# Patient Record
Sex: MALE | Race: WHITE | Employment: UNEMPLOYED | URBAN - METROPOLITAN AREA
[De-identification: names, ages, dates, MRNs, and addresses within clinical notes are randomized per-mention and may not be internally consistent; named-entity substitution may affect disease eponyms.]

---

## 2017-05-11 ENCOUNTER — OFFICE VISIT (OUTPATIENT)
Dept: FAMILY MEDICINE CLINIC | Age: 32
End: 2017-05-11

## 2017-05-11 VITALS
SYSTOLIC BLOOD PRESSURE: 135 MMHG | WEIGHT: 245 LBS | HEART RATE: 81 BPM | OXYGEN SATURATION: 96 % | TEMPERATURE: 97.8 F | RESPIRATION RATE: 18 BRPM | HEIGHT: 68 IN | BODY MASS INDEX: 37.13 KG/M2 | DIASTOLIC BLOOD PRESSURE: 77 MMHG

## 2017-05-11 DIAGNOSIS — Z00.00 PHYSICAL EXAM, ANNUAL: Primary | ICD-10-CM

## 2017-05-11 DIAGNOSIS — Z23 ENCOUNTER FOR IMMUNIZATION: ICD-10-CM

## 2017-05-11 DIAGNOSIS — E66.9 OBESITY (BMI 35.0-39.9 WITHOUT COMORBIDITY): ICD-10-CM

## 2017-05-11 PROBLEM — K21.9 GASTROESOPHAGEAL REFLUX DISEASE WITHOUT ESOPHAGITIS: Status: ACTIVE | Noted: 2017-05-11

## 2017-05-11 RX ORDER — FAMOTIDINE 20 MG/1
20 TABLET, FILM COATED ORAL 2 TIMES DAILY
COMMUNITY

## 2017-05-11 NOTE — MR AVS SNAPSHOT
Visit Information Date & Time Provider Department Dept. Phone Encounter #  
 5/11/2017 11:00 AM Reji Medellin, [address] [place] [address] Road [phone] [id number] Upcoming Health Maintenance Date Due DTaP/Tdap/Td series (1 - Tdap) 5/18/2008 INFLUENZA AGE 9 TO ADULT 8/1/2017 Allergies as of 5/11/2017  Review Complete On: 5/11/2017 By: Reji Medellin NP No Known Allergies Current Immunizations  Never Reviewed No immunizations on file. Not reviewed this visit You Were Diagnosed With   
  
 Codes Comments Physical exam, annual    -  Primary ICD-10-CM: Z00.00 ICD-9-CM: V70.0 Obesity (BMI 35.0-39.9 without comorbidity) ([facility]     ICD-10-CM: P61.0 ICD-9-CM: 278.00 Vitals BP Pulse Temp Resp Height(growth percentile) Weight(growth percentile) 135/77 (BP 1 Location: Left arm, BP Patient Position: Sitting) 81 97.8 °F (36.6 °C) (Oral) 18 5' 8\" (1.727 m) 245 lb (111.1 kg) SpO2 BMI Smoking Status 96% 37.25 kg/m2 Never Smoker Vitals History BMI and BSA Data Body Mass Index Body Surface Area  
 37.25 kg/m 2 2.31 m 2 Preferred Pharmacy [facility] Phone [id number] [phone] Your Updated Medication List  
  
   
This list is accurate as of: 5/11/17 12:19 PM.  Always use your most recent med list.  
  
  
  
  
 famotidine 20 mg tablet Commonly known as:  PEPCID Take 20 mg by mouth two (2) times a day. We Performed the Following AMB POC URINALYSIS DIP STICK AUTO W/ MICRO [69180 CPT(R)] CBC W/O DIFF [18987 CPT(R)] HEMOGLOBIN A1C WITH EAG [08067 CPT(R)] LIPID PANEL [92653 CPT(R)] METABOLIC PANEL, COMPREHENSIVE [68848 CPT(R)] [place] COLLECTION VENOUS BLOOD,VENIPUNCTURE [id number] CPT(R)] [place] HANDLG&/OR CONVEY OF SPEC FOR TR OFFICE TO LAB [03078 CPT(R)] URINALYSIS W/ RFLX MICROSCOPIC [15569 [facility](R)] Introducing [facility]! Juan José Murphy introduces [employer] patient portal. Now you can access parts of your medical record, email your doctor's office, and request medication refills online. 1. In your internet browser, go to [email or web address] 2. Click on the First Time User? Click Here link in the Sign In box. You will see the New Member Sign Up page. 3. Enter your [employer] Access Code exactly as it appears below. You will not need to use this code after youve completed the sign-up process. If you do not sign up before the expiration date, you must request a new code. · [employer] Access Code: [email or web address] Expires: 8/9/2017 11:42 AM 
 
4. Enter the last four digits of your Social Security Number (xxxx) and Date of Birth (mm/dd/yyyy) as indicated and click Submit. You will be taken to the next sign-up page. 5. Create a SquareHub ID. This will be your [employer]Hub login ID and cannot be changed, so think of one that is secure and easy to remember. 6. Create a [employer]Hub password. You can change your password at any time. 7. Enter your Password Reset Question and Answer. This can be used at a later time if you forget your password. 8. Enter your e-mail address. You will receive e-mail notification when new information is available in [address] 9. Click Sign Up. You can now view and download portions of your medical record. 10. Click the Download Summary menu link to download a portable copy of your medical information. If you have questions, please visit the Frequently Asked Questions section of the [employer] website. Remember, [employer] is NOT to be used for urgent needs. For medical emergencies, dial 911. Now available from your iPhone and Android! Please provide this summary of care documentation to your next provider. Your primary care clinician is listed as NONE. If you have any questions after today's visit, please call [phone].

## 2017-05-11 NOTE — PROGRESS NOTES
1. Have you been to the ER, urgent care clinic since your last visit? Hospitalized since your last visit? No    2. Have you seen or consulted any other health care providers outside of the [address] [place] [address] since your last visit? Include any pap smears or colon screening.  No     Chief Complaint   Patient presents with    New Patient     establish care and PCP    Complete Physical     patient is here for annual exam       Learning Assessment 5/11/2017   PRIMARY LEARNER Patient   PRIMARY LANGUAGE ENGLISH   LEARNER PREFERENCE PRIMARY LISTENING   ANSWERED BY eric paul   RELATIONSHIP SELF

## 2017-05-11 NOTE — PROGRESS NOTES
HISTORY OF PRESENT ILLNESS  Kandace Qiu is a 36 y.o. male. HPI  New patient to establish care. Admits to not consistently following a healthy diet and does not exercise on a regular basis. History of GERD. Takes OTC pepcid daily. Past Medical History:   Diagnosis Date    Acid reflux     GERD (gastroesophageal reflux disease)     Pancreatitis 2015    secondary to gall stones     Patient Active Problem List   Diagnosis Code    Obesity (BMI 35.0-39.9 without comorbidity) ([facility]) E66.9    Gastroesophageal reflux disease without esophagitis K21.9     Current Outpatient Prescriptions   Medication Sig    famotidine (PEPCID) 20 mg tablet Take 20 mg by mouth two (2) times a day. No current facility-administered medications for this visit. Social History   Substance Use Topics    Smoking status: Never Smoker    Smokeless tobacco: Never Used    Alcohol use Yes      Comment: social     Visit Vitals    /77 (BP 1 Location: Left arm, BP Patient Position: Sitting)    Pulse 81    Temp 97.8 °F (36.6 °C) (Oral)    Resp 18    Ht 5' 8\" (1.727 m)    Wt 245 lb (111.1 kg)    SpO2 96%    BMI 37.25 kg/m2     Review of Systems   Constitutional: Negative. Respiratory: Negative for cough and shortness of breath. Cardiovascular: Negative for chest pain, palpitations and leg swelling. Gastrointestinal: Positive for heartburn. Negative for abdominal pain, blood in stool, constipation, diarrhea, nausea and vomiting. Neurological: Negative for headaches. All other systems reviewed and are negative. Physical Exam   Constitutional: He is oriented to person, place, and time. No distress. Obese. HENT:   Right Ear: Tympanic membrane and ear canal normal.   Left Ear: Tympanic membrane and ear canal normal.   Mouth/Throat: Oropharynx is clear and moist.   Eyes: Conjunctivae and EOM are normal. Pupils are equal, round, and reactive to light. Neck: Neck supple. No thyromegaly present. Cardiovascular: Normal rate, regular rhythm and normal heart sounds. Pulmonary/Chest: Effort normal and breath sounds normal.   Abdominal: Soft. Bowel sounds are normal. He exhibits no distension. There is no tenderness. There is no rebound and no guarding. Hernia confirmed negative in the right inguinal area and confirmed negative in the left inguinal area. Genitourinary: Testes normal and penis normal. Right testis shows no mass. Left testis shows no mass. Musculoskeletal: Normal range of motion. He exhibits no edema. Lymphadenopathy:     He has no cervical adenopathy. Neurological: He is alert and oriented to person, place, and time. He has normal reflexes. No cranial nerve deficit. Coordination normal.   Skin: Skin is warm and dry. Scattered nevi on back. Psychiatric: He has a normal mood and affect. His behavior is normal. Thought content normal.       ASSESSMENT and PLAN  Marcelino Holguin was seen today for new patient and complete physical.    Diagnoses and all orders for this visit:    Physical exam, annual  -     AMB POC URINALYSIS DIP STICK AUTO W/ MICRO  -     [place] HANDLG&/OR CONVEY OF SPEC FOR TR OFFICE TO LAB  -     [place] COLLECTION VENOUS BLOOD,VENIPUNCTURE  -     CBC W/O DIFF  -     HEMOGLOBIN A1C WITH EAG  -     LIPID PANEL  -     METABOLIC PANEL, COMPREHENSIVE  -     URINALYSIS W/ RFLX MICROSCOPIC  Age appropriate health maintenance and prevention reviewed. Follow healthy diet and exercise regularly at least 4-5x weekly. Fasting labs sent. He had a sweet roll this am.    Obesity (BMI 35.0-39.9 without comorbidity) ([facility])  -     HEMOGLOBIN A1C WITH EAG  Weight loss recommendations given. Encounter for immunization  -     Tetanus, diphtheria toxoids and acellular pertussis vaccine,(TDAP) in individs, >=7 years, IM    Vaccine given without immediate adverse effect. Follow up 6 months or sooner pending test results.

## 2017-05-12 LAB
ALBUMIN SERPL-MCNC: 4.9 G/DL (ref 3.5–5.5)
ALBUMIN/GLOB SERPL: 1.8 {RATIO} (ref 1.2–2.2)
ALP SERPL-CCNC: 53 IU/L (ref 39–117)
ALT SERPL-CCNC: 120 IU/L (ref 0–44)
APPEARANCE UR: CLEAR
AST SERPL-CCNC: 83 IU/L (ref 0–40)
BILIRUB SERPL-MCNC: 0.7 MG/DL (ref 0–1.2)
BILIRUB UR QL STRIP: NEGATIVE
BUN SERPL-MCNC: 14 MG/DL (ref 6–20)
BUN/CREAT SERPL: 13 (ref 9–20)
CALCIUM SERPL-MCNC: 9.8 MG/DL (ref 8.7–10.2)
CHLORIDE SERPL-SCNC: 98 MMOL/L (ref 96–106)
CHOLEST SERPL-MCNC: 205 MG/DL (ref 100–199)
CO2 SERPL-SCNC: 20 MMOL/L (ref 18–29)
COLOR UR: YELLOW
CREAT SERPL-MCNC: 1.11 MG/DL (ref 0.76–1.27)
ERYTHROCYTE [DISTWIDTH] IN BLOOD BY AUTOMATED COUNT: 13.4 % (ref 12.3–15.4)
EST. AVERAGE GLUCOSE BLD GHB EST-MCNC: 114 MG/DL
GLOBULIN SER CALC-MCNC: 2.8 G/DL (ref 1.5–4.5)
GLUCOSE SERPL-MCNC: 87 MG/DL (ref 65–99)
GLUCOSE UR QL: NEGATIVE
HBA1C MFR BLD: 5.6 % (ref 4.8–5.6)
HCT VFR BLD AUTO: 47.1 % (ref 37.5–51)
HDLC SERPL-MCNC: 42 MG/DL
HGB BLD-MCNC: 16.2 G/DL (ref 12.6–17.7)
HGB UR QL STRIP: NEGATIVE
INTERPRETATION, 910389: NORMAL
KETONES UR QL STRIP: NEGATIVE
LDLC SERPL CALC-MCNC: 122 MG/DL (ref 0–99)
LEUKOCYTE ESTERASE UR QL STRIP: NEGATIVE
MCH RBC QN AUTO: 30.3 PG (ref 26.6–33)
MCHC RBC AUTO-ENTMCNC: 34.4 G/DL (ref 31.5–35.7)
MCV RBC AUTO: 88 FL (ref 79–97)
MICRO URNS: NORMAL
NITRITE UR QL STRIP: NEGATIVE
PH UR STRIP: 6.5 [PH] (ref 5–7.5)
PLATELET # BLD AUTO: 264 X10E3/UL (ref 150–379)
POTASSIUM SERPL-SCNC: 4.4 MMOL/L (ref 3.5–5.2)
PROT SERPL-MCNC: 7.7 G/DL (ref 6–8.5)
PROT UR QL STRIP: NEGATIVE
RBC # BLD AUTO: 5.34 X10E6/UL (ref 4.14–5.8)
SODIUM SERPL-SCNC: 140 MMOL/L (ref 134–144)
SP GR UR: 1.02 (ref 1–1.03)
TRIGL SERPL-MCNC: 203 MG/DL (ref 0–149)
UROBILINOGEN UR STRIP-MCNC: 1 MG/DL (ref 0.2–1)
VLDLC SERPL CALC-MCNC: 41 MG/DL (ref 5–40)
WBC # BLD AUTO: 7.6 X10E3/UL (ref 3.4–10.8)

## 2017-05-15 NOTE — PROGRESS NOTES
Liver enzymes are elevated. I sent letter with results. Recommend limiting any use of tylenol, any dietary supplements, alcohol intake and work on weight loss. Schedule follow up OV in 2 months to recheck.

## 2017-07-17 ENCOUNTER — OFFICE VISIT (OUTPATIENT)
Dept: FAMILY MEDICINE CLINIC | Age: 32
End: 2017-07-17

## 2017-07-17 VITALS
OXYGEN SATURATION: 98 % | BODY MASS INDEX: 36.8 KG/M2 | WEIGHT: 242.8 LBS | TEMPERATURE: 98.7 F | HEIGHT: 68 IN | HEART RATE: 92 BPM | SYSTOLIC BLOOD PRESSURE: 136 MMHG | RESPIRATION RATE: 18 BRPM | DIASTOLIC BLOOD PRESSURE: 79 MMHG

## 2017-07-17 DIAGNOSIS — E66.9 OBESITY (BMI 35.0-39.9 WITHOUT COMORBIDITY): ICD-10-CM

## 2017-07-17 DIAGNOSIS — R79.89 ELEVATED LFTS: Primary | ICD-10-CM

## 2017-07-17 DIAGNOSIS — M79.604 PAIN OF RIGHT LOWER EXTREMITY: ICD-10-CM

## 2017-07-17 NOTE — PROGRESS NOTES
Fanny Serrano is a 29 y.o. male  Chief Complaint   Patient presents with    Results     1. Have you been to the ER, urgent care clinic since your last visit? Hospitalized since your last visit? No    2. Have you seen or consulted any other health care providers outside of the [address] [place] [address] since your last visit? Include any pap smears or colon screening.  No

## 2017-07-17 NOTE — MR AVS SNAPSHOT
Visit Information Date & Time Provider Department Dept. Phone Encounter #  
 7/17/2017  8:30 AM Niurka Luna NP [address] [place] [address] [phone] [id number] Upcoming Health Maintenance Date Due INFLUENZA AGE 9 TO ADULT 8/1/2017 DTaP/Tdap/Td series (2 - Td) 5/11/2027 Allergies as of 7/17/2017  Review Complete On: 7/17/2017 By: Niurka uLna NP No Known Allergies Current Immunizations  Never Reviewed Name Date Tdap 5/11/2017 Not reviewed this visit You Were Diagnosed With   
  
 Codes Comments Elevated LFTs    -  Primary ICD-10-CM: R94.5 ICD-9-CM: 790.6 Obesity (BMI 35.0-39.9 without comorbidity) ([facility]     ICD-10-CM: W94.1 ICD-9-CM: 278.00 Pain of right lower extremity     ICD-10-CM: M79.604 ICD-9-CM: 729.5 Vitals BP Pulse Temp Resp Height(growth percentile) Weight(growth percentile) 136/79 (BP 1 Location: Right arm, BP Patient Position: Sitting) 92 98.7 °F (37.1 °C) (Oral) 18 5' 8\" (1.727 m) 242 lb 12.8 oz (110.1 kg) SpO2 BMI Smoking Status 98% 36.92 kg/m2 Never Smoker Vitals History BMI and BSA Data Body Mass Index Body Surface Area  
 36.92 kg/m 2 2.3 m 2 Preferred Pharmacy [facility] Name Phone [id number] [phone] Your Updated Medication List  
  
   
This list is accurate as of: 7/17/17  9:21 AM.  Always use your most recent med list.  
  
  
  
  
 famotidine 20 mg tablet Commonly known as:  PEPCID Take 20 mg by mouth two (2) times a day. We Performed the Following HEPATIC FUNCTION PANEL [55358 CPT(R)] HEPATITIS C AB [86984 CPT(R)] [place] COLLECTION VENOUS BLOOD,VENIPUNCTURE [id number] CPT(R)] [place] HANDLG&/OR CONVEY OF SPEC FOR TR OFFICE TO LAB [24976 CPT(R)] URIC ACID [id number] CPT(R)] Patient Instructions Arch Pain: Exercises Your Care Instructions Here are some examples of typical rehabilitation exercises for your condition. Start each exercise slowly. Ease off the exercise if you start to have pain. Your doctor or physical therapist will tell you when you can start these exercises and which ones will work best for you. How to do the exercises Plantar fascia stretch 1. Sit in a chair and put your affected foot on your other knee. 2. Hold the heel of your foot in one hand, and grasp your toes with the other hand. 3. Pull on your heel (toward your body), and at the same time pull your toes back with your other hand. 4. You should feel a stretch along the bottom of your foot. 5. Hold 15 to 30 seconds. 6. Repeat 2 to 4 times. Plantar fascia stretch (kneeling) Note: You may want to place a pillow under your knees for this exercise. 1. Get on your hands and knees on the floor. Keep your heels pointing up and the balls of your feet and your toes on the floor. 2. Slowly sit back toward your ankles. 3. If this is too hard, you can try doing it one leg at a time. Stand up, and then kneel on one knee and keep the other leg forward. Place the foot of your forward leg flat on the ground and bend that knee. The heel on the leg still behind you should point up. The ball and toes of that foot should be on the floor. Sit back toward that ankle. 4. Hold 15 to 30 seconds. 5. Repeat 2 to 4 times. Switch legs if you are doing this one leg at a time. Plantar fascia self-massage 1. Sit in a chair. 2. Place your affected foot on a firm, tube-shaped object, such as a can or water bottle. 3. Roll your foot back and forth over the object to massage the bottom of your foot. 4. If you want to do ice massage, fill a water bottle about three-fourths of the way full and freeze before using. 5. Continue for 2 to 5 minutes. Bilateral calf stretch (knees straight) 1.  Place a book on the floor a few inches from a wall or countertop, and put the balls of your feet on it. Your heels should be on the floor. The book needs to be thick enough so that you can feel a gentle stretch in each calf. If you are not steady on your feet, hold on to a chair, counter, or wall while you do this stretch. 2. Keep your knees straight, and lean forward until you feel a stretch in each calf. 3. To get more stretch, add another book or use a thicker book, such as a phone book, a dictionary, or an encyclopedia. 4. Hold the stretch for at least 15 to 30 seconds. 5. Repeat 2 to 4 times. Bilateral calf stretch (knees bent) 1. Place a book on the floor a few inches from a wall or countertop, and put the balls of your feet on it. Your heels should be on the floor. The book needs to be thick enough so that you can feel a gentle stretch in each calf. If you are not steady on your feet, hold on to a chair, counter, or wall while you do this stretch. 2. Bend your knees, and lean forward until you feel a stretch in each calf. 3. To get more stretch, add another book or use a thicker book, such as a phone book, a dictionary, or an encyclopedia. 4. Hold the stretch for at least 15 to 30 seconds. 5. Repeat 2 to 4 times. [place] pick-ups 1. Put some marbles on the floor next to a cup. 
2. Sit down, and use the toes of your affected foot to lift up one marble from the floor at a time. Then try to put the marble in the cup. 
3. Repeat 8 to 12 times. Towel scrunches 1. Sit down, and place your affected foot on a towel on the floor. You may also do this with both feet on the towel. 2. Scrunch the towel toward you with your toes. Then use your toes to push the towel back into place. 3. Repeat 8 to 12 times. Heel raises on a step 1. Stand on the bottom step of a staircase, facing up toward the stairs. Put the balls of your feet on the step. If you are not steady on your feet, hold on to the banister or wall. 2. Keeping both knees straight, slowly lift your heels above the step so that you are standing on your toes. Then slowly lower your heels below the step and toward the floor. 3. Return to the starting position, with your feet even with the step. 4. Repeat 8 to 12 times. Follow-up care is a key part of your treatment and safety. Be sure to make and go to all appointments, and call your doctor if you are having problems. It's also a good idea to know your test results and keep a list of the medicines you take. Where can you learn more? Go to http://caitlin-fausitna.info/. Enter H119 in the search box to learn more about \"Arch Pain: Exercises. \" Current as of: March 21, 2017 Content Version: 11.3 © 7953-9462 [employer]. Care instructions adapted under license by [employer] (which disclaims liability or warranty for this information). If you have questions about a medical condition or this instruction, always ask your healthcare professional. [place] [address] any warranty or liability for your use of this information. Introducing [facility]! Dear Jony Miller: Thank you for requesting a [employer] account. Our records indicate that you already have an active [employer] account. You can access your account anytime at [email or web address] Did you know that you can access your hospital and ER discharge instructions at any time in [employer]? You can also review all of your test results from your hospital stay or ER visit. Additional Information If you have questions, please visit the Frequently Asked Questions section of the [employer] website at [email or web address] Remember, [employer] is NOT to be used for urgent needs. For medical emergencies, dial 911. Now available from your iPhone and Android! Please provide this summary of care documentation to your next provider. Your primary care clinician is listed as NONE. If you have any questions after today's visit, please call [phone].

## 2017-07-17 NOTE — PROGRESS NOTES
HISTORY OF PRESENT ILLNESS  David Velasco is a 29 y.o. male. HPI  Follow up elevated LFT. Patient seen as NP 2 months ago for CPE. LFT came back elevated. AST 83, . Denies any past problems with elevated LFT. He is s/p gallbladder removal in the past.  Rare, social alcohol use, does not use tylenol or take supplements. History of obesity. Admits to not consistently following a healthy diet and does not exercise on a regular basis. Does some walking occasionally. Today he also c/o pain on the top of his right foot for about 1 week. Denies any recent injury or unusual/strenuous activity. States he was diagnosed a few years ago with gout but never had uric acid level checked. Past Medical History:   Diagnosis Date    Acid reflux     GERD (gastroesophageal reflux disease)     Pancreatitis 2015    secondary to gall stones     Patient Active Problem List   Diagnosis Code    Obesity (BMI 35.0-39.9 without comorbidity) ([facility]) E66.9    Gastroesophageal reflux disease without esophagitis K21.9     Current Outpatient Prescriptions   Medication Sig    famotidine (PEPCID) 20 mg tablet Take 20 mg by mouth two (2) times a day. No current facility-administered medications for this visit. Social History   Substance Use Topics    Smoking status: Never Smoker    Smokeless tobacco: Never Used    Alcohol use Yes      Comment: social     Visit Vitals    /79 (BP 1 Location: Right arm, BP Patient Position: Sitting)    Pulse 92    Temp 98.7 °F (37.1 °C) (Oral)    Resp 18    Ht 5' 8\" (1.727 m)    Wt 242 lb 12.8 oz (110.1 kg)    SpO2 98%    BMI 36.92 kg/m2       Review of Systems   Constitutional: Negative for chills, fever, malaise/fatigue and weight loss. Respiratory: Negative for cough and shortness of breath. Cardiovascular: Negative for chest pain, palpitations and leg swelling. Gastrointestinal: Negative for abdominal pain, constipation and diarrhea.    Musculoskeletal: Right dorsal foot pain. All other systems reviewed and are negative. Physical Exam   Constitutional: No distress. Obese. Neck: Neck supple. Cardiovascular: Normal rate, regular rhythm and normal heart sounds. Pulmonary/Chest: Effort normal and breath sounds normal.   Abdominal: Soft. He exhibits no distension and no mass. There is no tenderness. There is no rebound and no guarding. Musculoskeletal:        Right ankle: Normal. Achilles tendon normal.        Right foot: There is tenderness (3'rd dorsal , proximal metatarsal.). There is normal range of motion, no bony tenderness, no swelling and normal capillary refill. Lymphadenopathy:     He has no cervical adenopathy. Neurological: He is alert. Skin: Skin is warm and dry. Psychiatric: He has a normal mood and affect. ASSESSMENT and PLAN  Catrachito Casper was seen today for results, weight management and foot pain. Diagnoses and all orders for this visit:    Elevated LFTs  -     [place] HANDLG&/OR CONVEY OF SPEC FOR TR OFFICE TO LAB  -     [place] COLLECTION VENOUS BLOOD,VENIPUNCTURE  -     HEPATIC FUNCTION PANEL  -     HEPATITIS C AB  Probable fatty liver. Recheck labs today. Obesity (BMI 35.0-39.9 without comorbidity) ([address]  Weight loss recommendations given. Reviewed healthy, weight loss diet and exercise recommendations. Pain of right lower extremity  -     URIC ACID  Unlikely gout. Probable functional.  Recommend stretches, ice bid x 15 min, aleve 2 tabs bid x 3-5 days. Recommend orthotic arch support, supportive foot wear. Podiatry referral INI with above. Follow up pending test results.

## 2017-07-17 NOTE — PATIENT INSTRUCTIONS
Arch Pain: Exercises  Your Care Instructions  Here are some examples of typical rehabilitation exercises for your condition. Start each exercise slowly. Ease off the exercise if you start to have pain. Your doctor or physical therapist will tell you when you can start these exercises and which ones will work best for you. How to do the exercises  Plantar fascia stretch    1. Sit in a chair and put your affected foot on your other knee. 2. Hold the heel of your foot in one hand, and grasp your toes with the other hand. 3. Pull on your heel (toward your body), and at the same time pull your toes back with your other hand. 4. You should feel a stretch along the bottom of your foot. 5. Hold 15 to 30 seconds. 6. Repeat 2 to 4 times. Plantar fascia stretch (kneeling)    Note: You may want to place a pillow under your knees for this exercise. 1. Get on your hands and knees on the floor. Keep your heels pointing up and the balls of your feet and your toes on the floor. 2. Slowly sit back toward your ankles. 3. If this is too hard, you can try doing it one leg at a time. Stand up, and then kneel on one knee and keep the other leg forward. Place the foot of your forward leg flat on the ground and bend that knee. The heel on the leg still behind you should point up. The ball and toes of that foot should be on the floor. Sit back toward that ankle. 4. Hold 15 to 30 seconds. 5. Repeat 2 to 4 times. Switch legs if you are doing this one leg at a time. Plantar fascia self-massage    1. Sit in a chair. 2. Place your affected foot on a firm, tube-shaped object, such as a can or water bottle. 3. Roll your foot back and forth over the object to massage the bottom of your foot. 4. If you want to do ice massage, fill a water bottle about three-fourths of the way full and freeze before using. 5. Continue for 2 to 5 minutes. Bilateral calf stretch (knees straight)    1.  Place a book on the floor a few inches from a wall or countertop, and put the balls of your feet on it. Your heels should be on the floor. The book needs to be thick enough so that you can feel a gentle stretch in each calf. If you are not steady on your feet, hold on to a chair, counter, or wall while you do this stretch. 2. Keep your knees straight, and lean forward until you feel a stretch in each calf. 3. To get more stretch, add another book or use a thicker book, such as a phone book, a dictionary, or an encyclopedia. 4. Hold the stretch for at least 15 to 30 seconds. 5. Repeat 2 to 4 times. Bilateral calf stretch (knees bent)    1. Place a book on the floor a few inches from a wall or countertop, and put the balls of your feet on it. Your heels should be on the floor. The book needs to be thick enough so that you can feel a gentle stretch in each calf. If you are not steady on your feet, hold on to a chair, counter, or wall while you do this stretch. 2. Bend your knees, and lean forward until you feel a stretch in each calf. 3. To get more stretch, add another book or use a thicker book, such as a phone book, a dictionary, or an encyclopedia. 4. Hold the stretch for at least 15 to 30 seconds. 5. Repeat 2 to 4 times. Sumner pick-ups    1. Put some marbles on the floor next to a cup.  2. Sit down, and use the toes of your affected foot to lift up one marble from the floor at a time. Then try to put the marble in the cup.  3. Repeat 8 to 12 times. Towel scrunches    1. Sit down, and place your affected foot on a towel on the floor. You may also do this with both feet on the towel. 2. Scrunch the towel toward you with your toes. Then use your toes to push the towel back into place. 3. Repeat 8 to 12 times. Heel raises on a step    1. Stand on the bottom step of a staircase, facing up toward the stairs. Put the balls of your feet on the step. If you are not steady on your feet, hold on to the banister or wall.   2. Keeping both knees straight, slowly lift your heels above the step so that you are standing on your toes. Then slowly lower your heels below the step and toward the floor. 3. Return to the starting position, with your feet even with the step. 4. Repeat 8 to 12 times. Follow-up care is a key part of your treatment and safety. Be sure to make and go to all appointments, and call your doctor if you are having problems. It's also a good idea to know your test results and keep a list of the medicines you take. Where can you learn more? Go to http://caitlin-faustina.info/. Enter H119 in the search box to learn more about \"Arch Pain: Exercises. \"  Current as of: March 21, 2017  Content Version: 11.3  © 8657-5034 [employer]. Care instructions adapted under license by [employer] (which disclaims liability or warranty for this information). If you have questions about a medical condition or this instruction, always ask your healthcare professional. [address] any warranty or liability for your use of this information.

## 2017-07-18 LAB
ALBUMIN SERPL-MCNC: 4.3 G/DL (ref 3.5–5.5)
ALP SERPL-CCNC: 48 IU/L (ref 39–117)
ALT SERPL-CCNC: 76 IU/L (ref 0–44)
AST SERPL-CCNC: 74 IU/L (ref 0–40)
BILIRUB DIRECT SERPL-MCNC: 0.17 MG/DL (ref 0–0.4)
BILIRUB SERPL-MCNC: 0.5 MG/DL (ref 0–1.2)
HCV AB S/CO SERPL IA: <0.1 S/CO RATIO (ref 0–0.9)
PROT SERPL-MCNC: 7 G/DL (ref 6–8.5)
URATE SERPL-MCNC: 10.2 MG/DL (ref 3.7–8.6)